# Patient Record
(demographics unavailable — no encounter records)

---

## 2024-11-25 NOTE — ASSESSMENT
[FreeTextEntry1] : We discussed the underlying pathology. Treatment options reviewed. Medication use discussed. An injection is planned today.  PT is planned.  Cautions discussed. Questions answered.  Patient seen by Dr. Darin Kc, who determined the assessment and plan. Sari SOLORIO participated in the care of the patient, including the history and physical exam. IEmelia, am scribing for Dr. Darin Kc in his presence for the chief complaint, physical exam, studies, assessment, and/or plan.   Procedure Name: Large Joint Injection / Aspiration: Depomedrol, Lidocaine and Guidance Ultrasound   Large Joint Injection was performed because of pain and inflammation. Depomedrol: An injection of Depomedrol 40 mg/cc, 2 cc. Lidocaine: An injection of Lidocaine 1 mg/cc, 13 cc.   Medication was injected in the left subacromial space and glenohumeral joint from a posterior approach. Patient has tried OTC's including aspirin, Ibuprofen, Aleve etc. or prescription NSAIDS, and/or exercises at home and/ or physical therapy without satisfactory response. The risks, benefits, and alternatives to steroid injection were explained in full to the patient. Risks outlined include but are not limited to infection, sepsis, bleeding, scarring, skin discoloration, temporary increase in pain, syncopal episode, failure to resolve symptoms, allergic reaction, symptom recurrence, and elevation of blood sugar in diabetics. Patient understood the risks. All questions were answered. After discussion, patient requested an injection. Oral informed consent was obtained.  Sterile preparation with betadine and aseptic technique was utilized for the procedure, including the preparation of the solutions used for the injection. Patient tolerated the procedure well. Post Procedure Instructions: Patient was advised to call if redness, pain, or fever occur and apply ice for 15 min. out of every hour for the next 12-24 hours as tolerated. Patient was advised to rest the joint(s) for 3 days.  Advised to ice the injection site this evening. Ultrasound Guidance was used for the following reasons: for precise injection in area of tear. Visualization of the needle and placement of injection was performed without complication.

## 2024-11-25 NOTE — REASON FOR VISIT
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  No discrete prior history.  There is pain at night.  She can sleep.  Reaching [place] is better than BTB.  PT for 1 month has helped.  There is tingling down the arm into the entire hand.  She avoids consistent OTC medication use.  Topical agents can help some.  Her most recent A1C was 6.6.

## 2024-11-25 NOTE — PHYSICAL EXAM
[Left] : left shoulder [Standing] : standing [Moderate] : moderate [Mild] : mild [4 ___] : forward flexion 4[unfilled]/5 [4___] : external rotation 4[unfilled]/5 [] : no sensory deficits [Right] : right shoulder [de-identified] : +arc [FreeTextEntry3] : R SHOULDER: 175/75/T10 [TWNoteComboBox4] : passive forward flexion 140 degrees [TWNoteComboBox6] : internal rotation L3 [de-identified] : external rotation 45 degrees

## 2024-11-25 NOTE — IMAGING
[Left] : left shoulder [FreeTextEntry1] : 2V: The GH is OK.  There are some AC changes. [FreeTextEntry5] : 2V: There is a Type II acromion with a spur.

## 2024-11-25 NOTE — DATA REVIEWED
[FreeTextEntry1] : MRI L SHOULDER ZP 11/2024: I reviewed the images and my interpretation is that there is a high-grade partial tear. There is biceps fluid tho read as normal. Effusion is noted. The muscle is good.

## 2024-11-25 NOTE — HISTORY OF PRESENT ILLNESS
[Sudden] : sudden [Throbbing] : throbbing [Tightness] : tightness [Frequent] : frequent [Leisure] : leisure [Sleep] : sleep [Physical therapy] : physical therapy [Exercising] : exercising [Full time] : Work status: full time [de-identified] : R SHOULDER PAIN AFTER A FALL WITH A LOADING INJURY [] : no [de-identified] : UC [de-identified] : XR MRI

## 2024-11-25 NOTE — REVIEW OF SYSTEMS
[Arthralgia] : arthralgia [Joint Stiffness] : joint stiffness [Anxiety] : anxiety [Negative] : Heme/Lymph

## 2024-11-25 NOTE — CONSULT LETTER
[Dear  ___] : Dear  [unfilled], [Consult Letter:] : I had the pleasure of evaluating your patient, [unfilled]. [Please see my note below.] : Please see my note below. [Consult Closing:] : Thank you very much for allowing me to participate in the care of this patient.  If you have any questions, please do not hesitate to contact me. [Sincerely,] : Sincerely, [FreeTextEntry3] : Darin Kc M.D. Shoulder Surgery

## 2025-01-03 NOTE — PHYSICAL EXAM
[Left] : left shoulder [Standing] : standing [Moderate] : moderate [Mild] : mild [4 ___] : forward flexion 4[unfilled]/5 [4___] : external rotation 4[unfilled]/5 [Right] : right shoulder [] : no sensory deficits [de-identified] : +arc [FreeTextEntry3] : R SHOULDER: 175/75/T10 [TWNoteComboBox4] : passive forward flexion 140 degrees [TWNoteComboBox6] : internal rotation L3 [de-identified] : external rotation 45 degrees

## 2025-01-03 NOTE — ASSESSMENT
[FreeTextEntry1] : We discussed the underlying pathology. Treatment options reviewed. Medication use discussed. Celebrex 200mg is prescribed She will continue PT  If sx's persist, she will follow up with her .  Cautions discussed. Questions answered.  Patient seen by Dr. Darin Kc, who determined the assessment and plan I, Yamel Arnold, am scribing for Dr. Darin Kc in his presence for the chief complaint, physical exam, studies, assessment, and/or plan.

## 2025-01-03 NOTE — REASON FOR VISIT
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  No discrete prior history.  There is pain at night.  She can sleep.  Reaching [place] is better than BTB.  PT for 1 month has helped.  There is tingling down the arm into the entire hand.  She avoids consistent OTC medication use.  Topical agents can help some.  Her most recent A1C was 6.6.   PT and the left SA/GH injection did not help substantially.

## 2025-01-03 NOTE — HISTORY OF PRESENT ILLNESS
[8] : 8 [3] : 3 [Dull/Aching] : dull/aching [Tingling] : tingling [] : yes [de-identified] : Patient is here today for f/u left shoulder pain. Pain has somewhat improved. Attends PT 2x weekly helps. Experiencing n/t in left arm.  She's unsure if CSI gave any relief bc she reached out to grab something.    Diabetic A1c 6.6

## 2025-02-19 NOTE — HISTORY OF PRESENT ILLNESS
[5] : 5 [4] : 4 [Not working due to injury] : Work status: not working due to injury [de-identified] : 2/19/25: 1st post op left shoulder arthroscopy, DOS 2/13/25. [] : no [de-identified] : 3 physical therapy visits

## 2025-02-19 NOTE — PHYSICAL EXAM
[Left] : left shoulder [Sitting] : sitting [] : sensory deficits [de-identified] : Strength was not assessed. [de-identified] : She does have decreased sensation in her 4,5 fingers. [TWNoteComboBox4] : passive forward flexion 140 degrees

## 2025-02-19 NOTE — REASON FOR VISIT
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  DOS: 2/13/2025 Procedure: Left Shoulder Arthroscopy, Glenohumeral Debridement, Lysis of Adhesions, Subacromial Decompression, Small Rotator Cuff Repair Diagnosis: Adhesive Capsulitis, Partial Rotator Cuff Tear, Subacromial Impingement, Shoulder Pain, Glenohumeral Synovitis, Biceps [place] Complex Tear, Partial Posterior labral Tear, Biceps Tenosynovitis  She has had three PT visits.  The pain is manageable.  There is numbness in the left 4-5th digits.  She had therapy today.

## 2025-02-19 NOTE — HISTORY OF PRESENT ILLNESS
[5] : 5 [4] : 4 [Not working due to injury] : Work status: not working due to injury [de-identified] : 2/19/25: 1st post op left shoulder arthroscopy, DOS 2/13/25. [] : no [de-identified] : 3 physical therapy visits

## 2025-02-19 NOTE — ASSESSMENT
[FreeTextEntry1] : We reviewed the scope pictures. Physical Therapy is prescribed, 2x/week for 4-6 weeks, with protocol provided. She will use her medication as needed for PT and HEP. We will follow the numbness which should resolve as she gets out of the sling. Sling use outlined. Questions answered.  Patient seen by Dr. Darin Kc, who determined the assessment and plan. Sari SOLORIO participated in the care of the patient, including the history and physical exam.

## 2025-02-19 NOTE — PHYSICAL EXAM
[Left] : left shoulder [Sitting] : sitting [] : sensory deficits [de-identified] : Strength was not assessed. [de-identified] : She does have decreased sensation in her 4,5 fingers. [TWNoteComboBox4] : passive forward flexion 140 degrees

## 2025-02-19 NOTE — DATA REVIEWED
[FreeTextEntry1] : SHOULDER XR LEFT (AP/Outlet) taken and reviewed on 2/19/25: The clinically significant findings include that there is a good decompression.

## 2025-02-19 NOTE — REASON FOR VISIT
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  DOS: 2/13/2025 Procedure: Left Shoulder Arthroscopy, Glenohumeral Debridement, Lysis of Adhesions, Subacromial Decompression, Small Rotator Cuff Repair Diagnosis: Adhesive Capsulitis, Partial Rotator Cuff Tear, Subacromial Impingement, Shoulder Pain, Glenohumeral Synovitis, Biceps [place] Complex Tear, Partial Posterior labral Tear, Biceps Tenosynovitis  She has had three PT visits.  The pain is manageable.  There is numbness in the left 4-5th digits.  She had therapy today.

## 2025-02-21 NOTE — ASSESSMENT
[FreeTextEntry1] : We reviewed the scope pictures. PT is prescribed. 2-3x week for 6 weeks.  Sling use outlined. Table slides and pendulum demonstrated.  A Medrol dose pack is prescribed, to be taken as directed, with risks of GI symptoms reviewed. Questions answered.  Patient seen by Dr. Darin Kc, who determined the assessment and plan. Sari SOLORIO participated in the care of the patient, including the history and physical exam.  Josee OWUSU, am scribing for Dr. Darin Kc in his presence for the chief complaint, physical exam, studies, assessment, and/or plan.

## 2025-02-21 NOTE — REASON FOR VISIT
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  DOS: 2/13/2025 Procedure: Left Shoulder Arthroscopy, Glenohumeral Debridement, Lysis of Adhesions, Subacromial Decompression, Small Rotator Cuff Repair Diagnosis: Adhesive Capsulitis, Partial Rotator Cuff Tear, Subacromial Impingement, Shoulder Pain, Glenohumeral Synovitis, Biceps [place] Complex Tear, Partial Posterior labral Tear, Biceps Tenosynovitis.  On 2/19/25, no f/c/s.  There is pain.  She has had numbenss to the 4,5 fingers since the block wore off.  PT has started.

## 2025-02-21 NOTE — PHYSICAL EXAM
[Left] : left shoulder [Supine] : supine [] : no sensory deficits [de-identified] : Strength was not assessed. [TWNoteComboBox4] : passive forward flexion 120 degrees [de-identified] : external rotation 20 degrees

## 2025-02-27 NOTE — HISTORY OF PRESENT ILLNESS
[de-identified] : She had L RC repair  2/13/25 Since surgery she has numbness and tingling in the L SF and RF  She had a nerve block at that time  [FreeTextEntry1] : L hand

## 2025-02-27 NOTE — ASSESSMENT
[FreeTextEntry1] : This is a new undiagnosed problem that is concerning I rec MRI to evaluate for cubital tunnel vs brachial neuritis vs cervical radic  I recommend the patient take over the counter medication such as Advil/Motrin/Aleve or Tylenol for pain and inflammation prn Return after EMG

## 2025-02-27 NOTE — REASON FOR VISIT
[FreeTextEntry2] :  02/27/2025:  48-year-old female here today for: new injury L hand pain. She states she had rotator cuff sx on 02/13/2025. She had a 72-hour nerve block as well. She states after 02/17/2025, the L RF and SF are numb now. Cannot take steroid due to diabetes and her primary did not allow this.

## 2025-02-27 NOTE — PHYSICAL EXAM
[de-identified] : L elbow: Tender at the ulna nerve Pain with flexion +tinels at the ulna nerve +hyperflexion test  L hand: Decreased sensation in the ulna nerve distribution

## 2025-03-04 NOTE — ASSESSMENT
[FreeTextEntry1] :  Cubital tunnel syndrome is a progressive problem that once occurs will be a chronic issue that will likely continue until surgical treatment is necessary. Cubital tunnel may or may not be symptomatic. Treatment options for cubital tunnel include OTC NSAIDS, prescription NSAIDS, ice, bracing, OT, cortisone injection, and surgical release.  I recommend the patient take over the counter medication such as Advil/Motrin/Aleve or Tylenol for pain and inflammation  L cubital tunnel injection was performed because of numbness and tingling under aseptic conditions with betadine and alcohol Anesthesia: ethyl chloride sprayed topically Celestone 6mg/1cc: An injection of Celestone 1cc Lidocaine: An injection of Lidocaine 1% 1cc Marcaine: An injection of Marcaine 0.5% 1cc 25G needle     The risks, benefits, and alternatives to cortisone injection were explained in full to the patient. Risks outlined include but are not limited to infection, sepsis, bleeding, scarring, skin discoloration, temporary increase in pain, syncopal episode, failure to resolve symptoms, allergic reaction, symptom recurrence, and elevation of blood sugar in diabetics. Patient understood the risks. All questions were answered. After discussion of options, patient verbally consented to an injection. Sterile prep was done of the injection site. Patient tolerated the procedure well. Advised to ice the injection site this evening.

## 2025-03-04 NOTE — REASON FOR VISIT
[FreeTextEntry2] : 03/04/2025: 48 year old female here for F/U appointment of the Numbness of left hand. Here to review EMG results.

## 2025-03-04 NOTE — PHYSICAL EXAM
[de-identified] : L elbow: Tender at the ulna nerve Pain with flexion +tinels at the ulna nerve +hyperflexion test  L hand: Decreased sensation in the ulna nerve distribution

## 2025-03-04 NOTE — HISTORY OF PRESENT ILLNESS
[5] : 5 [4] : 4 [Dull/Aching] : dull/aching [de-identified] : L hand/SF/RF numbness since shoulder surgery/regional block  For the first time I independently reviewed the upper extremity EMG from [facility] 3/3/25 The clinically relevant findings shows mild/mod CuTS

## 2025-03-31 NOTE — ASSESSMENT
[FreeTextEntry1] : We discussed her course.  We discussed in part her cubital tunnel syndrome that Dr. Mccartney is managing.  Physical Therapy is prescribed to continue, 2-3x/week for 4-6 weeks.  Sleeper stretch demonstrated and advised with sheet provided.  Meloxicam 15 mg QD x 10-14 days is prescribed with risks of GI symptoms reviewed. She will continue with Dr. Mccartney.  She will RTW FD 4/1/25.  Their questions were answered.   Patient seen by Dr. Darin Kc, who determined the assessment and plan. Sari SOLORIO participated in the care of the patient, including the history and physical exam. Emelia OWUSU, am scribing for Dr. Darin Kc in his presence for the chief complaint, physical exam, studies, assessment, and/or plan.

## 2025-03-31 NOTE — DATA REVIEWED
[FreeTextEntry1] : --PREVIOUSLY REVIEWED STUDIES--  SHOULDER XR LEFT (AP/Outlet) taken and reviewed on 2/19/25: The clinically significant findings include that there is a good decompression.

## 2025-03-31 NOTE — PHYSICAL EXAM
[Left] : left shoulder [Sitting] : sitting [Mild] : mild [] : no swelling [FreeTextEntry8] : This is sore. [FreeTextEntry9] : MATT BURNETTE IR 90: 60 degrees [de-identified] : Strength was not assessed. [de-identified] : She does have decreased sensation in her 4,5 fingers and ulnar dorsal/volar surface of the hand. [TWNoteComboBox4] : passive forward flexion 155 degrees [TWNoteComboBox6] : internal rotation L3 [de-identified] : external rotation 60 degrees [TWNoteComboBox5] : internal rotation at 90 degrees of abduction 20 degrees

## 2025-03-31 NOTE — REASON FOR VISIT
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  DOS: 2/13/2025 Procedure: Left Shoulder Arthroscopy, Glenohumeral Debridement, Lysis of Adhesions, Subacromial Decompression, Small Rotator Cuff Repair Diagnosis: Adhesive Capsulitis, Partial Rotator Cuff Tear, Subacromial Impingement, Shoulder Pain, Glenohumeral Synovitis, Biceps [place] Complex Tear, Partial Posterior labral Tear, Biceps Tenosynovitis  She has had three PT visits.  The pain is manageable.  There is numbness in the left 4-5th digits.  She had therapy today. On 3/31/25, she returns with continued discomfort in the left shoulder.  She has continued with PT.  The numbness continues.  She saw Dr. Mccartney, and had a +EMG for CuTS.  A CSI here did not help.

## 2025-03-31 NOTE — HISTORY OF PRESENT ILLNESS
[4] : 4 [1] : 2 [Dull/Aching] : dull/aching [Sharp] : sharp [Not working due to injury] : Work status: not working due to injury [de-identified] : 2/19/25: 1st post op left shoulder arthroscopy, DOS 2/13/25. [] : This patient has had an injection before: no [FreeTextEntry1] : left shoulder  [de-identified] : 3 physical therapy visits [de-identified] : 2/13/25

## 2025-04-05 NOTE — REVIEW OF SYSTEMS
[email or web address]     [Joint Pain] : joint pain [Negative] : Heme/Lymph

## 2025-04-07 NOTE — HISTORY OF PRESENT ILLNESS
[5] : 5 [Dull/Aching] : dull/aching [de-identified] : L  hand numbness Unchanged since last visit Injection at the cubital tunnel did not help  L hand/SF/RF numbness since shoulder surgery/regional block  [FreeTextEntry1] : L hand/ elbow

## 2025-04-07 NOTE — ASSESSMENT
[FreeTextEntry1] :  Cubital tunnel syndrome is a progressive problem that once occurs will be a chronic issue that will likely continue until surgical treatment is necessary. Cubital tunnel may or may not be symptomatic. Treatment options for cubital tunnel include OTC NSAIDS, prescription NSAIDS, ice, bracing, OT, cortisone injection, and surgical release.   I recommend the patient take over the counter medication such as Advil/Motrin/Aleve or Tylenol for pain and inflammation Continue the Neurontin 300mg  QHS Return 4 weeks

## 2025-04-07 NOTE — PHYSICAL EXAM
[de-identified] : L elbow: Tender at the ulna nerve Pain with flexion +tinels at the ulna nerve +hyperflexion test  L hand: Decreased sensation in the ulna nerve distribution

## 2025-04-07 NOTE — REASON FOR VISIT
[FreeTextEntry2] :  04/07/2025:  48 year old female here today for: follow up L hand/ elbow pain, numbness and cubital tunnel

## 2025-05-12 NOTE — HISTORY OF PRESENT ILLNESS
[3] : 3 [1] : 2 [Full time] : Work status: full time [de-identified] : A1c: 7.2 [] : no [FreeTextEntry1] : Left shoulder [de-identified] : PT/HEP.  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

## 2025-05-12 NOTE — REASON FOR VISIT
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  DOS: 2/13/2025 Procedure: Left Shoulder Arthroscopy, Glenohumeral Debridement, Lysis of Adhesions, Subacromial Decompression, Small Rotator Cuff Repair Diagnosis: Adhesive Capsulitis, Partial Rotator Cuff Tear, Subacromial Impingement, Shoulder Pain, Glenohumeral Synovitis, Biceps [place] Complex Tear, Partial Posterior labral Tear, Biceps Tenosynovitis  She has had three PT visits.  The pain is manageable.  There is numbness in the left 4-5th digits.  She had therapy today. On 3/31/25, she returns with continued discomfort in the left shoulder.  She has continued with PT.  The numbness continues.  She saw Dr. Mccartney, and had a +EMG for CuTS.  A CSI here did not help. On 5/12/25, the left shoulder has further improvement.  "The shoulder feels great."

## 2025-05-12 NOTE — PHYSICAL EXAM
[Left] : left shoulder [Sitting] : sitting [] : no swelling [Minimal] : minimal [5 ___] : forward flexion 5[unfilled]/5 [FreeTextEntry8] : This is sore. [FreeTextEntry9] : MATT BURNETTE IR 90: 60 degrees [de-identified] : She does have decreased sensation in her 4,5 fingers and ulnar dorsal/volar surface of the hand. [TWNoteComboBox4] : passive forward flexion 160 degrees [TWNoteComboBox6] : internal rotation L2 [de-identified] : external rotation 60 degrees [TWNoteComboBox5] : internal rotation at 90 degrees of abduction 20 degrees

## 2025-05-12 NOTE — ASSESSMENT
[FreeTextEntry1] : We discussed her course.  She is making further gains. More are expected.  Physical Therapy is prescribed to continue, 2-3x/week for 4-6 weeks.  Sleeper stretch again demonstrated and advised with sheet provided.  She will continue the Meloxicam 15 mg she has QD x 10-14 days, with risks of GI symptoms reviewed. She will follow up in 6 weeks.  Her questions were answered.   Patient seen by Dr. Darin Kc, who determined the assessment and plan. Emelia OWUSU, am scribing for Dr. Darin Kc in his presence for the chief complaint, physical exam, studies, assessment, and/or plan.

## 2025-05-15 NOTE — ASSESSMENT
[FreeTextEntry1] :  Cubital tunnel syndrome is a progressive problem that once occurs will be a chronic issue that will likely continue until surgical treatment is necessary. Cubital tunnel may or may not be symptomatic. Treatment options for cubital tunnel include OTC NSAIDS, prescription NSAIDS, ice, bracing, OT, cortisone injection, and surgical release.   I recommend the patient take over the counter medication such as Advil/Motrin/Aleve or Tylenol for pain and inflammation  Return 6 weeks- possible surgery at that time

## 2025-05-15 NOTE — REASON FOR VISIT
[FreeTextEntry2] : 05/12/2025:  48-year-old female here today for: follow up L hand/ elbow pain, numbness and cubital tunnel. Patient reports she is feeling pain, numbness, and tingling. No changes since last visit.

## 2025-05-15 NOTE — PHYSICAL EXAM
[de-identified] : L elbow: Tender at the ulna nerve Pain with flexion +tinels at the ulna nerve +hyperflexion test  L hand: Decreased sensation in the ulna nerve distribution Min weakness

## 2025-05-15 NOTE — HISTORY OF PRESENT ILLNESS
[5] : 5 [Dull/Aching] : dull/aching [de-identified] : L  hand numbness Unchanged since last visit Injection at the cubital tunnel did not help  L hand/SF/RF numbness since shoulder surgery/regional block She not taken the Neurontin   [FreeTextEntry1] : L hand/ elbow